# Patient Record
(demographics unavailable — no encounter records)

---

## 2025-01-27 NOTE — DATA REVIEWED
[FreeTextEntry1] : 01/27/25 OC X-Ray Examination of the RIGHT SHOULDER: 3+ views: Unremarkable  01/27/25 OC X-ray right elbow 3 view: Unremarkable

## 2025-01-27 NOTE — DISCUSSION/SUMMARY
[de-identified] :  Reviewed all X-ray images with patient, interpretation was provided. MRI of the right shoulder to eval for RCT MRI of the right elbow to eval for ecrb tendon tear EMG of the upper extremity to evaluate for HNP Rx Medrol dose timi Follow up after scan.   Due to this patient expressing significant pain, I am prescribing an iceless cold/heat compression therapy device for at home use to be used 3-5 times per day at 40 degrees for 35 days. I would like my patient to begin with simultaneous cold & compression therapy at 10mm pressure. At the patients follow up I will determine whether they should continue with cold, or if they should transition to contrast cold/heat compression therapy. Unlike a conventional cold therapy unit that requires ice, the ThermX iceless device is set to a prescribed temperature that it will remain throughout the entire duration of use, whether that be cold compression, heat compression, or contrast compression. Cold therapy units that depend on ice melt over a very short period and do not provide compression which limits the compliance and effectiveness for pain/inflammation reduction that I am targeting for my patient. I have reached out to [facility] to supply this device as they are the exclusive provider of the ThermX and the patient will be contacted and instructed on how to utilize the device.  ----------------------------------------------- Home Exercise The patient is instructed on a home exercise program.  HUNTER FLANAGAN Acting as a Scribe for Dr. Chris LEZAMA, Hunter Flanagan, attest that this documentation has been prepared under the direction and in the presence of Provider Rosalio Perez MD.  Activity Modification The patient was advised to modify their activities.  Dx / Natural History The patient was advised of the diagnosis.  The natural history of the pathology was explained in full to the patient in layman's terms.  Several different treatment options were discussed and explained in full to the patient including the risks and benefits of both surgical and non-surgical treatments.  All questions and concerns were answered.  Pain Guide Activities The patient was advised to let pain guide the gradual advancement of activities.  RICE I explained to the patient that rest, ice, compression, and elevation would benefit them.  They may return to activity after follow-up or when they no longer have any pain.  The patient's current medication management of their orthopedic diagnosis was reviewed today: (1) We discussed a comprehensive treatment plan that included possible pharmaceutical management involving the use of prescription strength medications including but not limited to options such as oral Naprosyn 500mg BID, once daily Meloxicam 15 mg, or 500-650 mg Tylenol versus over the counter oral medications and topical prescription NSAID Pennsaid vs over the counter Voltaren gel. (2) There is a moderate risk of morbidity with further treatment, especially from use of prescription strength medications and possible side effects of these medications which include upset stomach with oral medications, skin reactions to topical medications and cardiac/renal issues with long term use. (3) I recommended that the patient follow-up with their medical physician to discuss any significant specific potential issues with long term medication use such as interactions with current medications or with exacerbation of underlying medical comorbidities. (4) The benefits and risks associated with use of injectable, oral or topical, prescription and over the counter anti-inflammatory medications were discussed with the patient. The patient voiced understanding of the risks including but not limited to bleeding, stroke, kidney dysfunction, heart disease, and were referred to the black box warning label for further information.

## 2025-01-27 NOTE — HISTORY OF PRESENT ILLNESS
[de-identified] : The patient is a 45 year old RIGHT hand dominant female who presents today complaining of RT SHOULDER AND ELBOW pain. Date of Injury/Onset: 1/1/25 Pain:    At Rest: 6/10  With Activity:  8-10/10  Mechanism of injury: Patient reports being at work, [employer], and went to sit on a chair when it broke in a forward motion and she fell onto her tight shoulder/arm. This IS a Work Related Injury being treated under Worker's Compensation. This is NOT an athletic injury occurring associated with an interscholastic or organized sports team. Quality of symptoms: numbness/tingling radiating to finger, dull. Improves with: Tylenol heat, ice. Worse with: movement Prior treatment: Neurologist ordered PT, [facility] ED had MRI performed. 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 Additional Information: None

## 2025-01-27 NOTE — PHYSICAL EXAM
[de-identified] : Neurologic: normal sensation, normal mood and affect, orientated and able to communicate   Constitutional: well developed and well nourished   Right Shoulder: +Impingement test +Neer test +Cortes test +Heather sign 4-/5 Supraspinatus Strength paraspinal tenderness FULL ROM with stiffness + Spurling  Right elbow: tenderness over epicondyle. ROM pain with pronation. Pain with supination. Elbow pain with resisted wrist extension. Elbow pain with resisted forearm supination.

## 2025-02-12 NOTE — DATA REVIEWED
[FreeTextEntry1] : 02/12/2025 OC EMG Examination  This scan was reviewed and interpreted by Dr. Perez, and his findings are- IMPRESSIONS: This electrodiagnostic study reveals evidence of severe right sensorimotor demyelinating and axonal median nerve neuropathy at the wrist. This is consistent with the clinical diagnosis of severe right Carpal Tunnel Syndrome. There is no electrophysiologic evidence of right ulnar neuropathy/cubital tunnel syndrome, isolated proximal mono-neuropathy of the right upper extremity, right brachial plexopathy or right cervical radiculopathy at this time. Recommend follow up study, if clinically indicated. Patient tolerated the study well, and hemostasis was maintained throughout.  01/27/25 OC X-Ray Examination of the RIGHT SHOULDER: 3+ views: Unremarkable  01/27/25 OC X-ray right elbow 3 view: Unremarkable

## 2025-02-12 NOTE — DISCUSSION/SUMMARY
[de-identified] :  Reviewed all X-ray images with patient, interpretation was provided. MRI of the right shoulder to eval for RCT MRI of the right elbow to eval for ecrb tendon tear EMG of the upper extremity to evaluate for HNP Rx Medrol dose timi Follow up after scan.  Discussed risks and benefits to right elbow lateral epicondyle steroid injection. Patient agreed to move forward with injection.  ULTRASOUND GUIDED RIGHT ELBOW CORTICOSTEROID INJECTION Cortisone shot into the Lateral Epicondyle is recommended because of the severity of symptom. The risks, benefits, and alternatives to cortisone injection were explained in full to the patient.  Risks outlined include but are not limited to infection, sepsis, bleeding, scarring, skin discoloration, temporary increase in pain, syncopal episode, failure to resolve symptoms, allergic reaction, symptom recurrence, and elevation of blood sugar in diabetics.  Patient understood the risks.  All questions were answered.  After discussion of options, patient requested an injection.  Oral informed consent was obtained and sterile prep was done of the injection site.  Oral informed consent was obtained and sterile prep was done of the injection site.  A mixture of 20mg of Kenalog, 1/2 cc of 1% Lidocaine was sterilely prepared by me.  Sterile technique was used to introduce the mixture into the lateral epicondyle.  Patient tolerated the procedure well.  Advised to ice the injection site this evening.   Due to this patient expressing significant pain, I am prescribing an iceless cold/heat compression therapy device for at home use to be used 3-5 times per day at 40 degrees for 35 days. I would like my patient to begin with simultaneous cold & compression therapy at 10mm pressure. At the patients follow up I will determine whether they should continue with cold, or if they should transition to contrast cold/heat compression therapy. Unlike a conventional cold therapy unit that requires ice, the ThermX iceless device is set to a prescribed temperature that it will remain throughout the entire duration of use, whether that be cold compression, heat compression, or contrast compression. Cold therapy units that depend on ice melt over a very short period and do not provide compression which limits the compliance and effectiveness for pain/inflammation reduction that I am targeting for my patient. I have reached out to [facility] to supply this device as they are the exclusive provider of the ThermX and the patient will be contacted and instructed on how to utilize the device.  ----------------------------------------------- Home Exercise The patient is instructed on a home exercise program.  HUNTER FLANAGAN Acting as a Scribe for Dr. Chris LEZAMA, Hunter Flanagan, attest that this documentation has been prepared under the direction and in the presence of Provider Rosalio Perez MD.  Activity Modification The patient was advised to modify their activities.  Dx / Natural History The patient was advised of the diagnosis.  The natural history of the pathology was explained in full to the patient in layman's terms.  Several different treatment options were discussed and explained in full to the patient including the risks and benefits of both surgical and non-surgical treatments.  All questions and concerns were answered.  Pain Guide Activities The patient was advised to let pain guide the gradual advancement of activities.  RICE I explained to the patient that rest, ice, compression, and elevation would benefit them.  They may return to activity after follow-up or when they no longer have any pain.  The patient's current medication management of their orthopedic diagnosis was reviewed today: (1) We discussed a comprehensive treatment plan that included possible pharmaceutical management involving the use of prescription strength medications including but not limited to options such as oral Naprosyn 500mg BID, once daily Meloxicam 15 mg, or 500-650 mg Tylenol versus over the counter oral medications and topical prescription NSAID Pennsaid vs over the counter Voltaren gel. (2) There is a moderate risk of morbidity with further treatment, especially from use of prescription strength medications and possible side effects of these medications which include upset stomach with oral medications, skin reactions to topical medications and cardiac/renal issues with long term use. (3) I recommended that the patient follow-up with their medical physician to discuss any significant specific potential issues with long term medication use such as interactions with current medications or with exacerbation of underlying medical comorbidities. (4) The benefits and risks associated with use of injectable, oral or topical, prescription and over the counter anti-inflammatory medications were discussed with the patient. The patient voiced understanding of the risks including but not limited to bleeding, stroke, kidney dysfunction, heart disease, and were referred to the black box warning label for further information.  Warm/Dry

## 2025-02-12 NOTE — PHYSICAL EXAM
[de-identified] : Neurologic: normal sensation, normal mood and affect, orientated and able to communicate   Constitutional: well developed and well nourished   Right Shoulder: +Impingement test +Neer test +Cortes test +Heather sign 4-/5 Supraspinatus Strength paraspinal tenderness FULL ROM with stiffness + Spurling  Right elbow: tenderness over epicondyle. ROM pain with pronation. Pain with supination. Elbow pain with resisted wrist extension. Elbow pain with resisted forearm supination.

## 2025-02-14 NOTE — ASSESSMENT
[FreeTextEntry1] : EXAM Right wrist with no swelling nor erythema. Able to make fist, oppose thumb to small finger and abduct fingers. Mild thenar atrophy. +Phalen's, +tinel at carpal, -tinel at [place], -tinel at cubital. -froment, -wartenberg. Decreased sensation in median and intact at superficial radial and intact in small and ulnar ring finger(normal at ulnar hand) prior to provocative testing. <2sec cap refill.   ASSESSMENT/PLAN Right CTS - reviewed bilateral EMG/NCV results of right severe CTS. Given patient's severity and continued symptoms despite nonoperative management, patient indicated for carpal tunnel release.   Patient with severe CTS on EMG, persistently numb and weak - steroid injections are not indicated.  We discussed the risks, benefits and alternatives to carpal tunnel release including risk of neurovascular injury, wound dehiscence/infection, continued numbness, continued weakness, need for reoperation, DVT and medical complications associated with anesthesia. Discussed that decompression halts progression, but that improvement with existing sensory or motor deficits are not guaranteed to return. Patient understood this conversation and all questions were answered. She elected to proceed.  Plan for right endoscopic carpal tunnel release under local with sedation. [facility].  F/u for surgery, 10 days thereafter.

## 2025-02-14 NOTE — HISTORY OF PRESENT ILLNESS
[de-identified] : Age: 45 year F PMHx: Asthma  Hand Dominance: RHD Chief Complaint: right elbow pain that is constant, she had a CSI which only relieved the pain minimally. she reports numbness and tenderness to the arm.  Trauma: Patient reports being at work, [employer], and went to sit on a chair when it broke in a forward motion and she fell onto her tight shoulder/arm (2/1/25). Outside Imaging/Treatment: Dr. Perez CSI 2/12/25, EMG, MRI and XR  OTC Medications: Tylenol  OT/PT: PT with minimal relief  Bracing: none Pain worse with: activity, using the arm, twisting  Pain better with: Meds, rest

## 2025-02-21 NOTE — DATA REVIEWED
[FreeTextEntry1] :  MRI images personally reviewed and reviewed with patient. C/S, T/S, L/S MRI [place] 01/2025: Normal spinal cord signal.  No intradural epidural paraspinal mass. Minimal MRI evidence of degenerative change.  Disc bulging of the cervical lumbar spine causing varying degrees of neuroforaminal stenosis and central spinal canal stenosis.

## 2025-02-21 NOTE — HISTORY OF PRESENT ILLNESS
[FreeTextEntry1] : 45 year F with PMHx significant for presents for initial evaluation regarding their neck pain.  WC / NF DOI:   Occupation:   Work status:   Current treatment:   Location: Quality: Exacerbating Factors: Alleviating Factors: Numbness/tingling: Weakness:   Bowel/bladder dysfunction: Denies   Prior injections:   Prior Treatments:   Pertinent Surgical History:   Anticoagulation:     Patient denies current infection, fevers, or chills. Physician Disclaimer: I have personally reviewed and confirmed all HPI data with the patient.